# Patient Record
Sex: MALE | Race: BLACK OR AFRICAN AMERICAN | NOT HISPANIC OR LATINO | Employment: UNEMPLOYED | URBAN - METROPOLITAN AREA
[De-identification: names, ages, dates, MRNs, and addresses within clinical notes are randomized per-mention and may not be internally consistent; named-entity substitution may affect disease eponyms.]

---

## 2021-12-06 ENCOUNTER — HOSPITAL ENCOUNTER (EMERGENCY)
Facility: HOSPITAL | Age: 21
Discharge: HOME | End: 2021-12-06
Attending: EMERGENCY MEDICINE | Admitting: EMERGENCY MEDICINE

## 2021-12-06 VITALS
HEIGHT: 70 IN | WEIGHT: 200.4 LBS | DIASTOLIC BLOOD PRESSURE: 70 MMHG | OXYGEN SATURATION: 97 % | HEART RATE: 77 BPM | TEMPERATURE: 98 F | SYSTOLIC BLOOD PRESSURE: 109 MMHG | RESPIRATION RATE: 16 BRPM | BODY MASS INDEX: 28.69 KG/M2

## 2021-12-06 DIAGNOSIS — F10.120 HANGOVER WITHOUT COMPLICATION (HCC): Primary | ICD-10-CM

## 2021-12-06 LAB
ALBUMIN SERPL-MCNC: 4.5 G/DL (ref 3.5–5.2)
ALBUMIN/GLOB SERPL: 1.5 G/DL
ALP SERPL-CCNC: 74 U/L (ref 39–117)
ALT SERPL W P-5'-P-CCNC: 109 U/L (ref 1–41)
ANION GAP SERPL CALCULATED.3IONS-SCNC: 11.5 MMOL/L (ref 5–15)
AST SERPL-CCNC: 44 U/L (ref 1–40)
BACTERIA UR QL AUTO: ABNORMAL /HPF
BASOPHILS # BLD AUTO: 0.02 10*3/MM3 (ref 0–0.2)
BASOPHILS NFR BLD AUTO: 0.2 % (ref 0–1.5)
BILIRUB SERPL-MCNC: 0.6 MG/DL (ref 0–1.2)
BILIRUB UR QL STRIP: NEGATIVE
BUN SERPL-MCNC: 9 MG/DL (ref 6–20)
BUN/CREAT SERPL: 8.7 (ref 7–25)
CALCIUM SPEC-SCNC: 9.4 MG/DL (ref 8.6–10.5)
CHLORIDE SERPL-SCNC: 101 MMOL/L (ref 98–107)
CLARITY UR: CLEAR
CO2 SERPL-SCNC: 25.5 MMOL/L (ref 22–29)
COLOR UR: ABNORMAL
CREAT SERPL-MCNC: 1.03 MG/DL (ref 0.76–1.27)
DEPRECATED RDW RBC AUTO: 38.1 FL (ref 37–54)
EOSINOPHIL # BLD AUTO: 0.06 10*3/MM3 (ref 0–0.4)
EOSINOPHIL NFR BLD AUTO: 0.5 % (ref 0.3–6.2)
ERYTHROCYTE [DISTWIDTH] IN BLOOD BY AUTOMATED COUNT: 11.8 % (ref 12.3–15.4)
ETHANOL BLD-MCNC: <10 MG/DL (ref 0–10)
ETHANOL UR QL: <0.01 %
GFR SERPL CREATININE-BSD FRML MDRD: 113 ML/MIN/1.73
GLOBULIN UR ELPH-MCNC: 3.1 GM/DL
GLUCOSE SERPL-MCNC: 110 MG/DL (ref 65–99)
GLUCOSE UR STRIP-MCNC: NEGATIVE MG/DL
HCT VFR BLD AUTO: 49.1 % (ref 37.5–51)
HGB BLD-MCNC: 17.3 G/DL (ref 13–17.7)
HGB UR QL STRIP.AUTO: NEGATIVE
HOLD SPECIMEN: NORMAL
HOLD SPECIMEN: NORMAL
HYALINE CASTS UR QL AUTO: ABNORMAL /LPF
IMM GRANULOCYTES # BLD AUTO: 0.03 10*3/MM3 (ref 0–0.05)
IMM GRANULOCYTES NFR BLD AUTO: 0.3 % (ref 0–0.5)
KETONES UR QL STRIP: NEGATIVE
LEUKOCYTE ESTERASE UR QL STRIP.AUTO: NEGATIVE
LIPASE SERPL-CCNC: 15 U/L (ref 13–60)
LYMPHOCYTES # BLD AUTO: 1.55 10*3/MM3 (ref 0.7–3.1)
LYMPHOCYTES NFR BLD AUTO: 13.5 % (ref 19.6–45.3)
MCH RBC QN AUTO: 31.3 PG (ref 26.6–33)
MCHC RBC AUTO-ENTMCNC: 35.2 G/DL (ref 31.5–35.7)
MCV RBC AUTO: 88.8 FL (ref 79–97)
MONOCYTES # BLD AUTO: 0.64 10*3/MM3 (ref 0.1–0.9)
MONOCYTES NFR BLD AUTO: 5.6 % (ref 5–12)
NEUTROPHILS NFR BLD AUTO: 79.9 % (ref 42.7–76)
NEUTROPHILS NFR BLD AUTO: 9.19 10*3/MM3 (ref 1.7–7)
NITRITE UR QL STRIP: NEGATIVE
NRBC BLD AUTO-RTO: 0 /100 WBC (ref 0–0.2)
PH UR STRIP.AUTO: 6.5 [PH] (ref 5–8)
PLATELET # BLD AUTO: 327 10*3/MM3 (ref 140–450)
PMV BLD AUTO: 10.3 FL (ref 6–12)
POTASSIUM SERPL-SCNC: 3.6 MMOL/L (ref 3.5–5.2)
PROT SERPL-MCNC: 7.6 G/DL (ref 6–8.5)
PROT UR QL STRIP: ABNORMAL
RBC # BLD AUTO: 5.53 10*6/MM3 (ref 4.14–5.8)
RBC # UR STRIP: ABNORMAL /HPF
REF LAB TEST METHOD: ABNORMAL
SODIUM SERPL-SCNC: 138 MMOL/L (ref 136–145)
SP GR UR STRIP: >=1.03 (ref 1–1.03)
SQUAMOUS #/AREA URNS HPF: ABNORMAL /HPF
UROBILINOGEN UR QL STRIP: ABNORMAL
WBC # UR STRIP: ABNORMAL /HPF
WBC NRBC COR # BLD: 11.49 10*3/MM3 (ref 3.4–10.8)
WHOLE BLOOD HOLD SPECIMEN: NORMAL
WHOLE BLOOD HOLD SPECIMEN: NORMAL

## 2021-12-06 PROCEDURE — 85025 COMPLETE CBC W/AUTO DIFF WBC: CPT | Performed by: EMERGENCY MEDICINE

## 2021-12-06 PROCEDURE — 83690 ASSAY OF LIPASE: CPT

## 2021-12-06 PROCEDURE — 63710000001 ONDANSETRON ODT 4 MG TABLET DISPERSIBLE: Performed by: EMERGENCY MEDICINE

## 2021-12-06 PROCEDURE — 99283 EMERGENCY DEPT VISIT LOW MDM: CPT

## 2021-12-06 PROCEDURE — 36415 COLL VENOUS BLD VENIPUNCTURE: CPT

## 2021-12-06 PROCEDURE — 82077 ASSAY SPEC XCP UR&BREATH IA: CPT

## 2021-12-06 PROCEDURE — 81001 URINALYSIS AUTO W/SCOPE: CPT

## 2021-12-06 PROCEDURE — 80053 COMPREHEN METABOLIC PANEL: CPT

## 2021-12-06 RX ORDER — FAMOTIDINE 20 MG/1
20 TABLET, FILM COATED ORAL ONCE
Status: COMPLETED | OUTPATIENT
Start: 2021-12-06 | End: 2021-12-06

## 2021-12-06 RX ORDER — ONDANSETRON 4 MG/1
4 TABLET, ORALLY DISINTEGRATING ORAL ONCE
Status: COMPLETED | OUTPATIENT
Start: 2021-12-06 | End: 2021-12-06

## 2021-12-06 RX ORDER — SODIUM CHLORIDE 0.9 % (FLUSH) 0.9 %
10 SYRINGE (ML) INJECTION AS NEEDED
Status: DISCONTINUED | OUTPATIENT
Start: 2021-12-06 | End: 2021-12-06 | Stop reason: HOSPADM

## 2021-12-06 RX ADMIN — FAMOTIDINE 20 MG: 20 TABLET, FILM COATED ORAL at 03:26

## 2021-12-06 RX ADMIN — ONDANSETRON 4 MG: 4 TABLET, ORALLY DISINTEGRATING ORAL at 03:26
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
## 2022-04-14 ENCOUNTER — APPOINTMENT (OUTPATIENT)
Dept: GENERAL RADIOLOGY | Facility: HOSPITAL | Age: 22
End: 2022-04-14

## 2022-04-14 ENCOUNTER — HOSPITAL ENCOUNTER (EMERGENCY)
Facility: HOSPITAL | Age: 22
Discharge: HOME | End: 2022-04-14
Attending: EMERGENCY MEDICINE | Admitting: EMERGENCY MEDICINE

## 2022-04-14 VITALS
TEMPERATURE: 99.8 F | HEART RATE: 104 BPM | RESPIRATION RATE: 20 BRPM | HEIGHT: 71 IN | OXYGEN SATURATION: 98 % | WEIGHT: 210.54 LBS | SYSTOLIC BLOOD PRESSURE: 120 MMHG | DIASTOLIC BLOOD PRESSURE: 64 MMHG | BODY MASS INDEX: 29.48 KG/M2

## 2022-04-14 DIAGNOSIS — L52 ERYTHEMA NODOSUM: ICD-10-CM

## 2022-04-14 DIAGNOSIS — B34.9 ACUTE VIRAL SYNDROME: ICD-10-CM

## 2022-04-14 DIAGNOSIS — R50.9 ACUTE FEBRILE ILLNESS: Primary | ICD-10-CM

## 2022-04-14 LAB
ALBUMIN SERPL-MCNC: 4.5 G/DL (ref 3.5–5.2)
ALBUMIN/GLOB SERPL: 1.5 G/DL
ALP SERPL-CCNC: 102 U/L (ref 39–117)
ALT SERPL W P-5'-P-CCNC: 70 U/L (ref 1–41)
ANION GAP SERPL CALCULATED.3IONS-SCNC: 13.7 MMOL/L (ref 5–15)
AST SERPL-CCNC: 56 U/L (ref 1–40)
BACTERIA UR QL AUTO: ABNORMAL /HPF
BASOPHILS # BLD AUTO: 0.02 10*3/MM3 (ref 0–0.2)
BASOPHILS NFR BLD AUTO: 0.2 % (ref 0–1.5)
BILIRUB SERPL-MCNC: 1.5 MG/DL (ref 0–1.2)
BILIRUB UR QL STRIP: ABNORMAL
BUN SERPL-MCNC: 9 MG/DL (ref 6–20)
BUN/CREAT SERPL: 7.4 (ref 7–25)
CALCIUM SPEC-SCNC: 9.1 MG/DL (ref 8.6–10.5)
CHLORIDE SERPL-SCNC: 94 MMOL/L (ref 98–107)
CLARITY UR: CLEAR
CO2 SERPL-SCNC: 26.3 MMOL/L (ref 22–29)
COLOR UR: ABNORMAL
CREAT SERPL-MCNC: 1.22 MG/DL (ref 0.76–1.27)
CRP SERPL-MCNC: 17.36 MG/DL (ref 0–0.5)
DEPRECATED RDW RBC AUTO: 35.8 FL (ref 37–54)
EGFRCR SERPLBLD CKD-EPI 2021: 87 ML/MIN/1.73
EOSINOPHIL # BLD AUTO: 0.01 10*3/MM3 (ref 0–0.4)
EOSINOPHIL NFR BLD AUTO: 0.1 % (ref 0.3–6.2)
ERYTHROCYTE [DISTWIDTH] IN BLOOD BY AUTOMATED COUNT: 11 % (ref 12.3–15.4)
ERYTHROCYTE [SEDIMENTATION RATE] IN BLOOD: 20 MM/HR (ref 0–15)
FLUAV AG NPH QL: NEGATIVE
FLUBV AG NPH QL IA: NEGATIVE
GLOBULIN UR ELPH-MCNC: 3.1 GM/DL
GLUCOSE SERPL-MCNC: 117 MG/DL (ref 65–99)
GLUCOSE UR STRIP-MCNC: NEGATIVE MG/DL
HCT VFR BLD AUTO: 44.4 % (ref 37.5–51)
HETEROPH AB SER QL LA: NEGATIVE
HGB BLD-MCNC: 15.6 G/DL (ref 13–17.7)
HGB UR QL STRIP.AUTO: NEGATIVE
HOLD SPECIMEN: NORMAL
HOLD SPECIMEN: NORMAL
HYALINE CASTS UR QL AUTO: ABNORMAL /LPF
IMM GRANULOCYTES # BLD AUTO: 0.03 10*3/MM3 (ref 0–0.05)
IMM GRANULOCYTES NFR BLD AUTO: 0.3 % (ref 0–0.5)
INR PPP: 1.31 (ref 2–3)
KETONES UR QL STRIP: ABNORMAL
LEUKOCYTE ESTERASE UR QL STRIP.AUTO: ABNORMAL
LYMPHOCYTES # BLD AUTO: 0.87 10*3/MM3 (ref 0.7–3.1)
LYMPHOCYTES NFR BLD AUTO: 8.8 % (ref 19.6–45.3)
MCH RBC QN AUTO: 30.7 PG (ref 26.6–33)
MCHC RBC AUTO-ENTMCNC: 35.1 G/DL (ref 31.5–35.7)
MCV RBC AUTO: 87.4 FL (ref 79–97)
MONOCYTES # BLD AUTO: 0.93 10*3/MM3 (ref 0.1–0.9)
MONOCYTES NFR BLD AUTO: 9.4 % (ref 5–12)
NEUTROPHILS NFR BLD AUTO: 8.06 10*3/MM3 (ref 1.7–7)
NEUTROPHILS NFR BLD AUTO: 81.2 % (ref 42.7–76)
NITRITE UR QL STRIP: NEGATIVE
NRBC BLD AUTO-RTO: 0 /100 WBC (ref 0–0.2)
PH UR STRIP.AUTO: 6.5 [PH] (ref 5–8)
PLATELET # BLD AUTO: 272 10*3/MM3 (ref 140–450)
PMV BLD AUTO: 10.8 FL (ref 6–12)
POTASSIUM SERPL-SCNC: 3.5 MMOL/L (ref 3.5–5.2)
PROT SERPL-MCNC: 7.6 G/DL (ref 6–8.5)
PROT UR QL STRIP: ABNORMAL
PROTHROMBIN TIME: 13.2 SECONDS (ref 9.4–12)
RBC # BLD AUTO: 5.08 10*6/MM3 (ref 4.14–5.8)
RBC # UR STRIP: ABNORMAL /HPF
REF LAB TEST METHOD: ABNORMAL
SARS-COV-2 RNA PNL SPEC NAA+PROBE: NOT DETECTED
SODIUM SERPL-SCNC: 134 MMOL/L (ref 136–145)
SP GR UR STRIP: 1.02 (ref 1–1.03)
SQUAMOUS #/AREA URNS HPF: ABNORMAL /HPF
UROBILINOGEN UR QL STRIP: ABNORMAL
WBC # UR STRIP: ABNORMAL /HPF
WBC NRBC COR # BLD: 9.92 10*3/MM3 (ref 3.4–10.8)
WHOLE BLOOD HOLD SPECIMEN: NORMAL
WHOLE BLOOD HOLD SPECIMEN: NORMAL

## 2022-04-14 PROCEDURE — 86140 C-REACTIVE PROTEIN: CPT | Performed by: EMERGENCY MEDICINE

## 2022-04-14 PROCEDURE — 86308 HETEROPHILE ANTIBODY SCREEN: CPT | Performed by: NURSE PRACTITIONER

## 2022-04-14 PROCEDURE — U0004 COV-19 TEST NON-CDC HGH THRU: HCPCS | Performed by: EMERGENCY MEDICINE

## 2022-04-14 PROCEDURE — 81001 URINALYSIS AUTO W/SCOPE: CPT | Performed by: EMERGENCY MEDICINE

## 2022-04-14 PROCEDURE — 80053 COMPREHEN METABOLIC PANEL: CPT | Performed by: EMERGENCY MEDICINE

## 2022-04-14 PROCEDURE — 85610 PROTHROMBIN TIME: CPT | Performed by: EMERGENCY MEDICINE

## 2022-04-14 PROCEDURE — 87086 URINE CULTURE/COLONY COUNT: CPT | Performed by: EMERGENCY MEDICINE

## 2022-04-14 PROCEDURE — 85025 COMPLETE CBC W/AUTO DIFF WBC: CPT | Performed by: EMERGENCY MEDICINE

## 2022-04-14 PROCEDURE — 87804 INFLUENZA ASSAY W/OPTIC: CPT | Performed by: EMERGENCY MEDICINE

## 2022-04-14 PROCEDURE — 85652 RBC SED RATE AUTOMATED: CPT | Performed by: EMERGENCY MEDICINE

## 2022-04-14 PROCEDURE — C9803 HOPD COVID-19 SPEC COLLECT: HCPCS | Performed by: EMERGENCY MEDICINE

## 2022-04-14 PROCEDURE — 99283 EMERGENCY DEPT VISIT LOW MDM: CPT

## 2022-04-14 PROCEDURE — 36415 COLL VENOUS BLD VENIPUNCTURE: CPT

## 2022-04-14 PROCEDURE — 71045 X-RAY EXAM CHEST 1 VIEW: CPT

## 2022-04-14 RX ORDER — IBUPROFEN 600 MG/1
600 TABLET ORAL ONCE
Status: COMPLETED | OUTPATIENT
Start: 2022-04-14 | End: 2022-04-14

## 2022-04-14 RX ORDER — SODIUM CHLORIDE 0.9 % (FLUSH) 0.9 %
10 SYRINGE (ML) INJECTION AS NEEDED
Status: DISCONTINUED | OUTPATIENT
Start: 2022-04-14 | End: 2022-04-14 | Stop reason: HOSPADM

## 2022-04-14 RX ORDER — ACETAMINOPHEN 325 MG/1
975 TABLET ORAL ONCE
Status: COMPLETED | OUTPATIENT
Start: 2022-04-14 | End: 2022-04-14

## 2022-04-14 RX ADMIN — IBUPROFEN 600 MG: 600 TABLET ORAL at 18:54

## 2022-04-14 RX ADMIN — ACETAMINOPHEN 975 MG: 325 TABLET ORAL at 18:54

## 2022-04-14 RX ADMIN — SODIUM CHLORIDE 1000 ML: 9 INJECTION, SOLUTION INTRAVENOUS at 19:06

## 2022-04-14 NOTE — ED PROVIDER NOTES
Time: 1830  Arrived by: Private vehicle  Chief Complaint: Fever, cough  History provided by: Patient      History of Present Illness:  Patient is a 21 y.o. year old male that presents to the emergency department with acute onset of fever and initially sore throat, followed by productive cough and congestion all week, and recently seen in urgent care and started on amoxicillin for URI 3 days ago.    He reports some swollen lymph nodes on either side of his neck.    He denies any known sick contacts.    At urgent care he swab negative for flu and strep and Covid.    He is now having red and swollen areas in bilateral lower extremities and feet for the past 3 or 4 days.    He has had some nausea and vomiting, reports headaches and body aches.    He is not having any diarrhea or any hematuria.        Similar Symptoms Previously: Yes  Recently seen: Yes, seen at urgent care      Patient Care Team  Primary Care Provider: Dr. Naqvi    Past Medical History:   Reviewed, otherwise healthy; remote history of childhood asthma    Social History     Socioeconomic History   • Marital status: Single   Tobacco Use   • Smoking status: Never Smoker   • Smokeless tobacco: Never Used   Vaping Use   • Vaping Use: Every day   Substance and Sexual Activity   • Alcohol use: Yes     Comment: social         No Known Allergies  History reviewed. No pertinent past medical history.  History reviewed. No pertinent surgical history.  History reviewed. No pertinent family history.    Home Medications:  Prior to Admission medications    Medication Sig Start Date End Date Taking? Authorizing Provider   amoxicillin (AMOXIL) 875 MG tablet Take 1 tablet by mouth 2 (Two) Times a Day for 10 days. 4/12/22 4/22/22  Jimbo Frey PA   brompheniramine-pseudoephedrine-DM 30-2-10 MG/5ML syrup Take 10 mL by mouth 4 (Four) Times a Day As Needed for Congestion, Cough or Allergies. 4/12/22   Jimbo Frey PA   fluticasone (FLONASE) 50 MCG/ACT nasal spray  "2 sprays into the nostril(s) as directed by provider Daily. 4/12/22   Jimbo Frey PA        Record Review:  I have reviewed the patient's records in [facility].     Review of Systems:  Review of Systems   I performed a 10 point review of systems which was all negative, except for the positives found in the HPI above.  Physical Exam:  /64   Pulse 104   Temp 99.8 °F (37.7 °C) (Oral)   Resp 20   Ht 180.3 cm (71\")   Wt 95.5 kg (210 lb 8.6 oz)   SpO2 98%   BMI 29.36 kg/m²     Physical Exam   General: Awake alert and in mild distress, looks to be feeling generally unwell    HEENT: Head normocephalic atraumatic, eyes PERRLA EOMI, nose normal, oropharynx normal, no lesions seen    Neck: Supple full range of motion, no meningismus, mild bilateral anterior cervical lymphadenopathy    Heart: Tachycardic in a regular rhythm, no murmurs or rubs, 2+ radial pulses bilaterally    Lungs: Clear to auscultation bilaterally without wheezes or crackles, no respiratory distress, occasional cough noted    Abdomen: Soft, nontender, nondistended, no rebound or guarding    Skin: Warm, dry, multiple scattered raised and mildly tender erythematous nodules on anterior lower legs and feet concerning for erythema nodosum.    Musculoskeletal: Normal range of motion, no lower extremity edema    Neurologic: Oriented x3, no motor deficits no sensory deficits    Psychiatric: Mood appears stable, no psychosis        Medications in the Emergency Department:  Medications   ibuprofen (ADVIL,MOTRIN) tablet 600 mg (600 mg Oral Given 4/14/22 1854)   sodium chloride 0.9 % bolus 1,000 mL (0 mL Intravenous Stopped 4/14/22 2051)   acetaminophen (TYLENOL) tablet 975 mg (975 mg Oral Given 4/14/22 1854)        Labs  Lab Results (last 24 hours)     Procedure Component Value Units Date/Time    Mononucleosis Screen [[id number]]  (Normal) Collected: 04/14/22 1719    Specimen: Blood Updated: 04/14/22 1801     Monospot Negative    CBC & Differential " [[id number]]  (Abnormal) Collected: 04/14/22 1719    Specimen: Blood Updated: 04/14/22 1836    Narrative:      The following orders were created for panel order CBC & Differential.  Procedure                               Abnormality         Status                     ---------                               -----------         ------                     CBC Auto Differential[[id number]]        Abnormal            Final result                 Please view results for these tests on the individual orders.    Comprehensive Metabolic Panel [[id number]]  (Abnormal) Collected: 04/14/22 1719    Specimen: Blood Updated: 04/14/22 1849     Glucose 117 mg/dL      BUN 9 mg/dL      Creatinine 1.22 mg/dL      Sodium 134 mmol/L      Potassium 3.5 mmol/L      Chloride 94 mmol/L      CO2 26.3 mmol/L      Calcium 9.1 mg/dL      Total Protein 7.6 g/dL      Albumin 4.50 g/dL      ALT (SGPT) 70 U/L      AST (SGOT) 56 U/L      Alkaline Phosphatase 102 U/L      Total Bilirubin 1.5 mg/dL      Globulin 3.1 gm/dL      A/G Ratio 1.5 g/dL      BUN/Creatinine Ratio 7.4     Anion Gap 13.7 mmol/L      eGFR 87.0 mL/min/1.73      Comment: National Kidney Foundation and American Society of Nephrology (ASN) Task Force recommended calculation based on the Chronic Kidney Disease Epidemiology Collaboration (CKD-EPI) equation refit without adjustment for race.       Narrative:      GFR Normal >60  Chronic Kidney Disease <60  Kidney Failure <15      Sedimentation Rate [[id number]]  (Abnormal) Collected: 04/14/22 1719    Specimen: Blood Updated: 04/14/22 1838     Sed Rate 20 mm/hr     C-reactive Protein [[id number]]  (Abnormal) Collected: 04/14/22 1719    Specimen: Blood Updated: 04/14/22 1850     C-Reactive Protein 17.36 mg/dL     Protime-INR [[id number]]  (Abnormal) Collected: 04/14/22 1719    Specimen: Blood Updated: 04/14/22 1837     Protime 13.2 Seconds      INR 1.31    Narrative:      Suggested Therapeutic Ranges For Oral Anticoagulant Therapy:  Level of  Therapy                      INR Target Range  Standard Dose                            2.0-3.0  High Dose                                2.5-3.5  Patients not receiving anticoagulant  Therapy Normal Range                     0.6-1.2    CBC Auto Differential [[id number]]  (Abnormal) Collected: 04/14/22 1719    Specimen: Blood Updated: 04/14/22 1836     WBC 9.92 10*3/mm3      RBC 5.08 10*6/mm3      Hemoglobin 15.6 g/dL      Hematocrit 44.4 %      MCV 87.4 fL      MCH 30.7 pg      MCHC 35.1 g/dL      RDW 11.0 %      RDW-SD 35.8 fl      MPV 10.8 fL      Platelets 272 10*3/mm3      Neutrophil % 81.2 %      Lymphocyte % 8.8 %      Monocyte % 9.4 %      Eosinophil % 0.1 %      Basophil % 0.2 %      Immature Grans % 0.3 %      Neutrophils, Absolute 8.06 10*3/mm3      Lymphocytes, Absolute 0.87 10*3/mm3      Monocytes, Absolute 0.93 10*3/mm3      Eosinophils, Absolute 0.01 10*3/mm3      Basophils, Absolute 0.02 10*3/mm3      Immature Grans, Absolute 0.03 10*3/mm3      nRBC 0.0 /100 WBC     Influenza Antigen, Rapid - Swab, Nasopharynx [[id number]]  (Normal) Collected: 04/14/22 1901    Specimen: Swab from Nasopharynx Updated: 04/14/22 1931     Influenza A Ag, EIA Negative     Influenza B Ag, EIA Negative    COVID PRE-OP / PRE-PROCEDURE SCREENING ORDER (NO ISOLATION) - Swab, Nasopharynx [[id number]]  (Normal) Collected: 04/14/22 1901    Specimen: Swab from Nasopharynx Updated: 04/14/22 2302    Narrative:      The following orders were created for panel order COVID PRE-OP / PRE-PROCEDURE SCREENING ORDER (NO ISOLATION) - Swab, Nasopharynx.  Procedure                               Abnormality         Status                     ---------                               -----------         ------                     COVID-19,APTIMA PANTHER(...[[id number]]  Normal              Final result                 Please view results for these tests on the individual orders.    COVID-19,APTIMA PANTHER(LYNDA), ESTELLA/ BRADLEY, NP/OP SWAB IN  UTM/VTM/SALINE TRANSPORT MEDIA,24 HR TAT - Swab, Nasopharynx [[id number]]  (Normal) Collected: 04/14/22 1901    Specimen: Swab from Nasopharynx Updated: 04/14/22 2302     COVID19 Not Detected    Narrative:      Fact sheet for providers: https://www.fda.gov/media/851881/download     Fact sheet for patients: https://www.fda.gov/media/737995/download    Test performed by RT PCR.    Urinalysis With Culture If Indicated - Urine, Clean Catch [[id number]]  (Abnormal) Collected: 04/14/22 2051    Specimen: Urine, Clean Catch Updated: 04/14/22 2103     Color, UA Dark Yellow     Appearance, UA Clear     pH, UA 6.5     Specific Gravity, UA 1.016     Glucose, UA Negative     Ketones, UA 15 mg/dL (1+)     Bilirubin, UA Small (1+)     Blood, UA Negative     Protein,  mg/dL (2+)     Leuk Esterase, UA Trace     Nitrite, UA Negative     Urobilinogen, UA >=8.0 E.U./dL    Urinalysis, Microscopic Only - Urine, Clean Catch [[id number]]  (Abnormal) Collected: 04/14/22 2051    Specimen: Urine, Clean Catch Updated: 04/14/22 2103     RBC, UA 3-5 /HPF      WBC, UA 6-12 /HPF      Bacteria, UA None Seen /HPF      Squamous Epithelial Cells, UA 0-2 /HPF      Hyaline Casts, UA 3-6 /LPF      Methodology Automated Microscopy    Urine Culture - Urine, Urine, Clean Catch [[id number]] Collected: 04/14/22 2051    Specimen: Urine, Clean Catch Updated: 04/14/22 2103           Imaging:  XR Chest 1 View    Result Date: 4/14/2022  PROCEDURE: XR CHEST 1 VW  COMPARISON: [facility], CR, CHEST PA/AP & LAT 2V, 2/26/2004, 1:24.  INDICATIONS: fever, cough  FINDINGS:  LUNGS: Normal.  No significant pulmonary parenchymal abnormalities.  VASCULATURE: Normal.  Unremarkable pulmonary vasculature.  CARDIAC: Normal.  No cardiac silhouette abnormality or cardiomegaly.  MEDIASTINUM: Normal.  No visible mass or adenopathy.  PLEURA: Normal.  No effusion or pleural thickening.  BONES: Normal.  No fracture or visible bony lesion.  OTHER: Negative.        No  acute cardiopulmonary process identified.       GAURANG WATKINS MD       Electronically Signed and Approved By: GAURANG WATKINS MD on 4/14/2022 at 19:12                Procedures:  Procedures    Progress                            Medical Decision Making:  MDM   In my differential diagnosis for this patient's fever I considered pneumonia, urinary tract infection, intra-abdominal process, skin or soft tissue infection, viral illness including COVID-19 or flu virus, bacteremia.        This patient is a pleasant 21-year-old otherwise healthy male presenting with new onset of fever and cough.    He arrives here with temperature of 100.7 Fahrenheit and heart rate of 120.    We are starting some IV fluids and giving Tylenol and getting urinalysis and chest x-ray and screening lab work as well as Covid and flu swabs and Monospot test.    I am checking inflammatory markers and platelets and coagulation studies given the possible purpura on his legs versus erythema nodosum.        Patient has normal white blood cell count and urinalysis and chest x-ray negative for infection, and flu swab negative and Monospot negative.    He does have elevated inflammatory markers and slightly elevated LFTs, and I consider most likely he has a self-limiting viral syndrome with concurrent erythema nodosum either secondary to the viral syndrome or secondary to recent amoxicillin use.    As I do not really see any indication for antibiotics at this time I will have him hold off on the amoxicillin in case of a hypersensitivity reaction resulting in erythema nodosum.    He will follow-up with his PCP this week, and I gave him supportive care instructions for rest, p.o. fluids, alternate Tylenol and ibuprofen as needed for fevers and aches, return to the ER for any worsening symptoms.    Final diagnoses:   Acute febrile illness   Acute viral syndrome   Erythema nodosum        Disposition:  ED Disposition     ED Disposition   Discharge     Condition   Stable    Comment   --                    Mulugeta Perera MD  04/15/22 0147

## 2022-04-15 LAB — BACTERIA SPEC AEROBE CULT: NO GROWTH

## 2022-04-15 NOTE — DISCHARGE INSTRUCTIONS
No signs of a bacterial infection were seen and you had a normal white blood cell count and normal chest x-ray and urine here.    You most likely have a viral illness causing your fever cough nausea and vomiting and malaise, which should run its course this week.    Get plenty of rest and drink plenty fluids for hydration and alternate Tylenol with ibuprofen as needed for fevers or aches.    The red welts on your legs and feet are due to erythema nodosum which can be seen following a viral illness or may possibly be due to a hypersensitivity reaction to the amoxicillin antibiotic you are on.    You could stop taking the amoxicillin, as you do not appear to have any bacterial infection that it would even be treating.    Please follow-up with your primary care physician to have your blood work rechecked within the week to make sure it is improving, as you had some mild elevation of your liver enzymes, which can be seen with a viral illness..

## 2022-11-07 ENCOUNTER — HOSPITAL ENCOUNTER (EMERGENCY)
Facility: HOSPITAL | Age: 22
Discharge: HOME | End: 2022-11-07
Attending: EMERGENCY MEDICINE | Admitting: EMERGENCY MEDICINE

## 2022-11-07 VITALS
BODY MASS INDEX: 30.8 KG/M2 | RESPIRATION RATE: 16 BRPM | SYSTOLIC BLOOD PRESSURE: 121 MMHG | TEMPERATURE: 98 F | OXYGEN SATURATION: 100 % | HEART RATE: 94 BPM | HEIGHT: 71 IN | WEIGHT: 220 LBS | DIASTOLIC BLOOD PRESSURE: 76 MMHG

## 2022-11-07 DIAGNOSIS — T50.901A ACCIDENTAL DRUG OVERDOSE, INITIAL ENCOUNTER: Primary | ICD-10-CM

## 2022-11-07 PROCEDURE — 99283 EMERGENCY DEPT VISIT LOW MDM: CPT

## 2022-11-07 NOTE — ED PROVIDER NOTES
Time: 2:33 AM EST  Arrived by: ambulance  Chief Complaint: Drug overdose  History provided by: pt  History is limited by: N/A     History of Present Illness:  Patient is a 21 y.o. year old male that presents to the emergency department via EMS after being found unresponsive.  Patient states he had taken a pill for recreational purposes and was not trying to harm himself.  EMS administered Narcan in route and the patient responded.  Patient vomited prior to arrival.  Patient had a nasal trumpet placed by EMS but he removed it upon arrival.  Patient is alert and oriented at time of exam.    HPI    Similar Symptoms Previously: no  Recently seen: no      Patient Care Team  Primary Care Provider: Laverne Naqvi MD    Past Medical History:     No Known Allergies  History reviewed. No pertinent past medical history.  History reviewed. No pertinent surgical history.  History reviewed. No pertinent family history.    Home Medications:  Prior to Admission medications    Medication Sig Start Date End Date Taking? Authorizing Provider   brompheniramine-pseudoephedrine-DM 30-2-10 MG/5ML syrup Take 10 mL by mouth 4 (Four) Times a Day As Needed for Congestion, Cough or Allergies. 4/12/22   Jimbo Frey PA   fluticasone (FLONASE) 50 MCG/ACT nasal spray 2 sprays into the nostril(s) as directed by provider Daily. 4/12/22   Jimbo Frey PA        Social History:   Social History     Tobacco Use   • Smoking status: Never   • Smokeless tobacco: Never   Vaping Use   • Vaping Use: Every day   Substance Use Topics   • Alcohol use: Yes     Comment: social   • Drug use: Yes       Review of Systems:  Review of Systems   Constitutional: Negative.    HENT: Negative.    Eyes: Negative.    Respiratory: Negative.    Cardiovascular: Negative.    Gastrointestinal: Negative.    Endocrine: Negative.    Genitourinary: Negative.    Musculoskeletal: Negative.    Skin: Negative.    Allergic/Immunologic: Negative.    Neurological:  "Negative.    Hematological: Negative.    Psychiatric/Behavioral: Negative.         Physical Exam:  /81   Pulse 97   Temp 97.9 °F (36.6 °C)   Resp 17   Ht 180.3 cm (71\")   Wt 99.8 kg (220 lb)   SpO2 100%   BMI 30.68 kg/m²     Physical Exam  Vitals and nursing note reviewed.   Constitutional:       Appearance: Normal appearance.   HENT:      Head: Normocephalic and atraumatic.   Eyes:      Extraocular Movements: Extraocular movements intact.      Pupils: Pupils are equal, round, and reactive to light.   Cardiovascular:      Rate and Rhythm: Normal rate and regular rhythm.      Heart sounds: Normal heart sounds.   Pulmonary:      Effort: Pulmonary effort is normal.      Breath sounds: Normal breath sounds.   Abdominal:      Palpations: Abdomen is soft.   Musculoskeletal:         General: Normal range of motion.      Cervical back: Normal range of motion and neck supple.   Skin:     General: Skin is warm and dry.   Neurological:      General: No focal deficit present.      Mental Status: He is alert and oriented to person, place, and time.   Psychiatric:         Mood and Affect: Mood normal.                Medications in the Emergency Department:  Medications - No data to display     Labs  Lab Results (last 24 hours)     ** No results found for the last 24 hours. **           Imaging:  No Radiology Exams Resulted Within Past 24 Hours    Procedures:  Procedures    Progress                            Medical Decision Making:  MDM   Patient was observed in the emergency department for 2 hours and is stable for discharge.  I advised the patient that the pill he took likely contained fentanyl which can cause life-threatening overdose.        Final diagnoses:   Accidental drug overdose, initial encounter        Disposition:  ED Disposition     ED Disposition   Discharge    Condition   Stable    Comment   --             Documentation assistance provided by Reji Donis DO acting as scribe for Reji Donis DO. " Information recorded by the scribe was done at my direction and has been verified and validated by me.        Reji Donis DO  11/07/22 0434

## 2023-08-02 ENCOUNTER — OFFICE VISIT (OUTPATIENT)
Dept: FAMILY MEDICINE CLINIC | Facility: CLINIC | Age: 23
End: 2023-08-02
Payer: COMMERCIAL

## 2023-08-02 VITALS
WEIGHT: 247 LBS | SYSTOLIC BLOOD PRESSURE: 115 MMHG | OXYGEN SATURATION: 99 % | DIASTOLIC BLOOD PRESSURE: 69 MMHG | BODY MASS INDEX: 35.36 KG/M2 | HEART RATE: 96 BPM | HEIGHT: 70 IN

## 2023-08-02 DIAGNOSIS — N50.89 TESTICLE LUMP: ICD-10-CM

## 2023-08-02 DIAGNOSIS — Z00.00 ANNUAL PHYSICAL EXAM: Primary | ICD-10-CM

## 2023-08-02 DIAGNOSIS — Z11.59 NEED FOR HEPATITIS C SCREENING TEST: ICD-10-CM

## 2023-08-02 DIAGNOSIS — Z13.29 SCREENING FOR THYROID DISORDER: ICD-10-CM

## 2023-08-02 DIAGNOSIS — Z13.220 SCREENING FOR LIPID DISORDERS: ICD-10-CM

## 2023-08-18 ENCOUNTER — HOSPITAL ENCOUNTER (OUTPATIENT)
Dept: ULTRASOUND IMAGING | Facility: HOSPITAL | Age: 23
Discharge: HOME | End: 2023-08-18
Payer: COMMERCIAL

## 2023-08-18 DIAGNOSIS — N50.89 TESTICLE LUMP: ICD-10-CM

## 2023-08-18 PROCEDURE — 76870 US EXAM SCROTUM: CPT

## 2024-04-20 ENCOUNTER — HOSPITAL ENCOUNTER (EMERGENCY)
Facility: HOSPITAL | Age: 24
Discharge: HOME | End: 2024-04-20
Attending: EMERGENCY MEDICINE
Payer: COMMERCIAL

## 2024-04-20 VITALS
WEIGHT: 249.12 LBS | OXYGEN SATURATION: 98 % | HEIGHT: 70 IN | TEMPERATURE: 98.2 F | RESPIRATION RATE: 20 BRPM | DIASTOLIC BLOOD PRESSURE: 91 MMHG | HEART RATE: 102 BPM | BODY MASS INDEX: 35.66 KG/M2 | SYSTOLIC BLOOD PRESSURE: 142 MMHG

## 2024-04-20 DIAGNOSIS — J45.21 MILD INTERMITTENT ASTHMA WITH EXACERBATION: ICD-10-CM

## 2024-04-20 DIAGNOSIS — J40 BRONCHITIS: Primary | ICD-10-CM

## 2024-04-20 LAB
FLUAV SUBTYP SPEC NAA+PROBE: NOT DETECTED
FLUBV RNA ISLT QL NAA+PROBE: NOT DETECTED
RSV RNA NPH QL NAA+NON-PROBE: NOT DETECTED
S PYO AG THROAT QL: NEGATIVE
SARS-COV-2 RNA RESP QL NAA+PROBE: NOT DETECTED

## 2024-04-20 PROCEDURE — 99283 EMERGENCY DEPT VISIT LOW MDM: CPT

## 2024-04-20 PROCEDURE — 87880 STREP A ASSAY W/OPTIC: CPT | Performed by: EMERGENCY MEDICINE

## 2024-04-20 PROCEDURE — 94640 AIRWAY INHALATION TREATMENT: CPT

## 2024-04-20 PROCEDURE — 25010000002 DEXAMETHASONE PER 1 MG: Performed by: NURSE PRACTITIONER

## 2024-04-20 PROCEDURE — 94799 UNLISTED PULMONARY SVC/PX: CPT

## 2024-04-20 PROCEDURE — 87637 SARSCOV2&INF A&B&RSV AMP PRB: CPT | Performed by: EMERGENCY MEDICINE

## 2024-04-20 PROCEDURE — 87081 CULTURE SCREEN ONLY: CPT | Performed by: EMERGENCY MEDICINE

## 2024-04-20 RX ORDER — ALBUTEROL SULFATE 90 UG/1
2 AEROSOL, METERED RESPIRATORY (INHALATION) EVERY 4 HOURS PRN
Qty: 6.7 G | Refills: 0 | Status: SHIPPED | OUTPATIENT
Start: 2024-04-20

## 2024-04-20 RX ORDER — MONTELUKAST SODIUM 10 MG/1
10 TABLET ORAL NIGHTLY
Qty: 14 TABLET | Refills: 0 | Status: SHIPPED | OUTPATIENT
Start: 2024-04-20 | End: 2024-05-04

## 2024-04-20 RX ORDER — PREDNISONE 50 MG/1
50 TABLET ORAL DAILY
Qty: 4 TABLET | Refills: 0 | Status: SHIPPED | OUTPATIENT
Start: 2024-04-20 | End: 2024-04-24

## 2024-04-20 RX ORDER — AZITHROMYCIN 250 MG/1
TABLET, FILM COATED ORAL
Qty: 6 TABLET | Refills: 0 | Status: SHIPPED | OUTPATIENT
Start: 2024-04-20

## 2024-04-20 RX ORDER — AZITHROMYCIN 250 MG/1
500 TABLET, FILM COATED ORAL ONCE
Status: COMPLETED | OUTPATIENT
Start: 2024-04-20 | End: 2024-04-20

## 2024-04-20 RX ORDER — IPRATROPIUM BROMIDE AND ALBUTEROL SULFATE 2.5; .5 MG/3ML; MG/3ML
3 SOLUTION RESPIRATORY (INHALATION) ONCE
Status: COMPLETED | OUTPATIENT
Start: 2024-04-20 | End: 2024-04-20

## 2024-04-20 RX ADMIN — AZITHROMYCIN DIHYDRATE 500 MG: 250 TABLET ORAL at 21:34

## 2024-04-20 RX ADMIN — DEXAMETHASONE SODIUM PHOSPHATE 10 MG: 10 INJECTION INTRAMUSCULAR; INTRAVENOUS at 21:34

## 2024-04-20 RX ADMIN — IPRATROPIUM BROMIDE AND ALBUTEROL SULFATE 3 ML: .5; 3 SOLUTION RESPIRATORY (INHALATION) at 21:42

## 2024-04-20 NOTE — Clinical Note
[facility] EMERGENCY ROOM  [address] [place] [address]  Phone: [phone]  Fax: [phone]    Pablo Ross was seen and treated in our emergency department on 4/20/2024.  He may return to work on 04/22/2024.         Thank you for choosing [facility].    Rebecca Shaw APRN

## 2024-04-21 NOTE — DISCHARGE INSTRUCTIONS
Rest, plenty of fluids.  Take your meds as prescribed.  Complete the antibiotics.  You may take over-the-counter acetaminophen and Motrin as needed for aches pains and fever.  Follow-up with your primary care provider for further evaluation and treatment and to ensure that your symptoms are getting better with time occasions.  Return to the emergency department immediately for any acutely developing respiratory distress, any persistent vomiting, any persistent chest pain with shortness of breath, any fevers of 101 or greater, or any new or worse concerns.

## 2024-04-21 NOTE — ED PROVIDER NOTES
Time: 9:35 PM EDT  Date of encounter:  4/20/2024  Independent Historian/Clinical History and Information was obtained by:   Patient    History is limited by: N/A    Chief Complaint: CONGESTION/WHEEZING      History of Present Illness:      Of the patient presents to the emergency department complains of upper respiratory symptoms of cough and congestion that he states he has had for about a week and a half.  He states that he had a history of asthma when he was younger but has not had it in inhaler for several years but states that he is felt like he has had wheezing tightness in his chest like he did when he was young intermittently this past week.  He denies any fevers.  States he has had no nausea or vomiting.  He reports that he has been coughing up quite a bit of clear but also some scant yellow mucus.  On exam his breath sounds are clear.  His airway is patent.  He states that he still feels tight in his chest when he takes a deep breath but there is no audible wheezing.  He denies any leg or calf pain.  He reports no hemoptysis.      History provided by:  Patient   used: No        Patient Care Team  Primary Care Provider: Zoraida Tuttle APRN    Past Medical History:     No Known Allergies  Past Medical History:   Diagnosis Date    Accidental drug overdose     Asthma      History reviewed. No pertinent surgical history.  History reviewed. No pertinent family history.    Home Medications:  Prior to Admission medications    Medication Sig Start Date End Date Taking? Authorizing Provider   albuterol sulfate  (90 Base) MCG/ACT inhaler Inhale 2 puffs Every 4 (Four) Hours As Needed for Wheezing or Shortness of Air. 4/20/24   Rebecca Shaw APRN   azithromycin (Zithromax Z-Ace) 250 MG tablet Take 2 tablets by mouth on day 1, then 1 tablet daily on days 2-5 4/20/24   Rebecca Shaw APRN   montelukast (SINGULAIR) 10 MG tablet Take 1 tablet by mouth Every Night for 14 days. 4/20/24 5/4/24   "Rebecca Shaw APRN   predniSONE (DELTASONE) 50 MG tablet Take 1 tablet by mouth Daily for 4 days. 4/20/24 4/24/24  Rebecca Shaw APRN        Social History:   Social History     Tobacco Use    Smoking status: Never    Smokeless tobacco: Never   Vaping Use    Vaping status: Every Day   Substance Use Topics    Alcohol use: Yes     Comment: social    Drug use: Yes         Review of Systems:  Review of Systems   Constitutional:  Negative for chills and fever.   HENT:  Positive for congestion and rhinorrhea. Negative for ear pain, sore throat and trouble swallowing.    Eyes:  Negative for pain.   Respiratory:  Positive for cough, chest tightness, shortness of breath and wheezing.    Cardiovascular:  Negative for chest pain and leg swelling.   Gastrointestinal:  Negative for abdominal pain, diarrhea, nausea and vomiting.   Genitourinary:  Negative for dysuria, flank pain, frequency, hematuria and urgency.   Musculoskeletal:  Negative for back pain, joint swelling, neck pain and neck stiffness.   Skin:  Negative for pallor and rash.   Neurological:  Negative for seizures and headaches.   All other systems reviewed and are negative.       Physical Exam:  /94   Pulse 97   Temp 98.4 °F (36.9 °C) (Oral)   Resp 20   Ht 177.8 cm (70\")   Wt 113 kg (249 lb 1.9 oz)   SpO2 97%   BMI 35.74 kg/m²     Physical Exam  Vitals and nursing note reviewed.   Constitutional:       General: He is not in acute distress.     Appearance: Normal appearance. He is not ill-appearing or toxic-appearing.   HENT:      Head: Normocephalic and atraumatic.      Nose: Congestion and rhinorrhea present.      Mouth/Throat:      Mouth: Mucous membranes are moist.      Pharynx: Oropharynx is clear.   Eyes:      General: No scleral icterus.     Conjunctiva/sclera: Conjunctivae normal.      Pupils: Pupils are equal, round, and reactive to light.   Cardiovascular:      Rate and Rhythm: Normal rate and regular rhythm.      Pulses: Normal pulses.    "   Heart sounds: Normal heart sounds.   Pulmonary:      Effort: Pulmonary effort is normal. No respiratory distress.      Breath sounds: Normal breath sounds. No wheezing.   Musculoskeletal:         General: No swelling or tenderness. Normal range of motion.      Cervical back: Normal range of motion and neck supple. No rigidity or tenderness.      Right lower leg: No edema.      Left lower leg: No edema.   Lymphadenopathy:      Cervical: No cervical adenopathy.   Skin:     General: Skin is warm and dry.      Capillary Refill: Capillary refill takes less than 2 seconds.      Findings: No rash.   Neurological:      General: No focal deficit present.      Mental Status: He is alert and oriented to person, place, and time. Mental status is at baseline.   Psychiatric:         Mood and Affect: Mood normal.         Behavior: Behavior normal.                Procedures:  Procedures      Medical Decision Making:      Comorbidities that affect care:    Asthma    External Notes reviewed:    None      The following orders were placed and all results were independently analyzed by me:  Orders Placed This Encounter   Procedures    COVID-19, FLU A/B, RSV PCR 1 HR TAT - Swab, Nasopharynx    Rapid Strep A Screen - Swab, Throat    Beta Strep Culture, Throat - Swab, Throat       Medications Given in the Emergency Department:  Medications   ipratropium-albuterol (DUO-NEB) nebulizer solution 3 mL (3 mL Nebulization Given 4/20/24 2142)   dexAMETHasone (DECADRON) 10 MG/ML oral solution 10 mg (10 mg Oral Given 4/20/24 2134)   azithromycin (ZITHROMAX) tablet 500 mg (500 mg Oral Given 4/20/24 2134)        ED Course:         Labs:    Lab Results (last 24 hours)       Procedure Component Value Units Date/Time    COVID-19, FLU A/B, RSV PCR 1 HR TAT - Swab, Nasopharynx [[id number]]  (Normal) Collected: 04/20/24 2043    Specimen: Swab from Nasopharynx Updated: 04/20/24 2126     COVID19 Not Detected     Influenza A PCR Not Detected     Influenza B  PCR Not Detected     RSV, PCR Not Detected    Narrative:      Fact sheet for providers: https://www.fda.gov/media/067920/download    Fact sheet for patients: https://www.fda.gov/media/566958/download    Test performed by PCR.    Rapid Strep A Screen - Swab, Throat [[id number]]  (Normal) Collected: 04/20/24 2043    Specimen: Swab from Throat Updated: 04/20/24 2106     Strep A Ag Negative    Beta Strep Culture, Throat - Swab, Throat [[id number]] Collected: 04/20/24 2043    Specimen: Swab from Throat Updated: 04/20/24 2105             Imaging:    No Radiology Exams Resulted Within Past 24 Hours      Differential Diagnosis and Discussion:    Cough: Differential diagnosis includes but is not limited to pneumonia, acute bronchitis, upper respiratory infection, ACE inhibitor use, allergic reaction, epiglottitis, seasonal allergies, chemical irritants, exercise-induced asthma, viral syndrome.  Dyspnea: Differential diagnosis includes but is not limited to metabolic acidosis, neurological disorders, psychogenic, asthma, pneumothorax, upper airway obstruction, COPD, pneumonia, noncardiogenic pulmonary edema, interstitial lung disease, anemia, congestive heart failure, and pulmonary embolism    All X-rays impressions were independently interpreted by me.    MDM  Number of Diagnoses or Management Options  Bronchitis: new and requires workup  Mild intermittent asthma with exacerbation: new and requires workup     Amount and/or Complexity of Data Reviewed  Clinical lab tests: reviewed    Risk of Complications, Morbidity, and/or Mortality  Presenting problems: low  Diagnostic procedures: low  Management options: low    Patient Progress  Patient progress: stable             Patient Care Considerations:    LABS: I considered ordering labs, however considering the patient stable condition and complaint I did not feel it was warranted at this time.      Consultants/Shared Management Plan:    None    Social Determinants of  Health:    Patient is independent, reliable, and has access to care.       Disposition and Care Coordination:    Discharged: The patient is suitable and stable for discharge with no need for consideration of admission.    I have explained the patient´s condition, diagnoses and treatment plan based on the information available to me at this time. I have answered questions and addressed any concerns. The patient has a good  understanding of the patient´s diagnosis, condition, and treatment plan as can be expected at this point. The vital signs have been stable. The patient´s condition is stable and appropriate for discharge from the emergency department.      The patient will pursue further outpatient evaluation with the primary care physician or other designated or consulting physician as outlined in the discharge instructions. They are agreeable to this plan of care and follow-up instructions have been explained in detail. The patient has received these instructions in written format and has expressed an understanding of the discharge instructions. The patient is aware that any significant change in condition or worsening of symptoms should prompt an immediate return to this or the closest emergency department or call to 911.  I have explained discharge medications and the need for follow up with the patient/caretakers. This was also printed in the discharge instructions. Patient was discharged with the following medications and follow up:      Medication List        New Prescriptions      albuterol sulfate  (90 Base) MCG/ACT inhaler  Commonly known as: PROVENTIL HFA;VENTOLIN HFA;PROAIR HFA  Inhale 2 puffs Every 4 (Four) Hours As Needed for Wheezing or Shortness of Air.     azithromycin 250 MG tablet  Commonly known as: Zithromax Z-Ace  Take 2 tablets by mouth on day 1, then 1 tablet daily on days 2-5     montelukast 10 MG tablet  Commonly known as: SINGULAIR  Take 1 tablet by mouth Every Night for 14 days.      predniSONE 50 MG tablet  Commonly known as: DELTASONE  Take 1 tablet by mouth Daily for 4 days.               Where to Get Your Medications        These medications were sent to [facility]/pharmacy #[id number] - [place], [place] - [address] - [phone]  - [phone] FX  [address] [place] [address]      Hours: 24-hours Phone: [phone]   albuterol sulfate  (90 Base) MCG/ACT inhaler  azithromycin 250 MG tablet  montelukast 10 MG tablet  predniSONE 50 MG tablet      Zoraida Tuttle, APRN  [address] [address] [phone]    Call   FOR FOLLOW UP       Final diagnoses:   Bronchitis   Mild intermittent asthma with exacerbation        ED Disposition       ED Disposition   Discharge    Condition   Stable    Comment   --               This medical record created using voice recognition software.             Rebecca Shaw, APRN  04/20/24 3061

## 2024-04-23 LAB — BACTERIA SPEC AEROBE CULT: NORMAL

## 2025-04-22 ENCOUNTER — APPOINTMENT (OUTPATIENT)
Dept: ULTRASOUND IMAGING | Facility: HOSPITAL | Age: 25
End: 2025-04-22
Payer: COMMERCIAL

## 2025-04-22 ENCOUNTER — HOSPITAL ENCOUNTER (EMERGENCY)
Facility: HOSPITAL | Age: 25
Discharge: HOME | End: 2025-04-23
Attending: EMERGENCY MEDICINE
Payer: COMMERCIAL

## 2025-04-22 DIAGNOSIS — I86.1 VARICOCELE: Primary | ICD-10-CM

## 2025-04-22 PROCEDURE — 99284 EMERGENCY DEPT VISIT MOD MDM: CPT

## 2025-04-22 PROCEDURE — 76870 US EXAM SCROTUM: CPT

## 2025-04-22 NOTE — Clinical Note
[facility] EMERGENCY ROOM  [address] [place] [address]  Phone: [phone]  Fax: [phone]    Pablo Ross was seen and treated in our emergency department on 4/22/2025.  He may return to work on 04/24/2025.         Thank you for choosing [facility].    Rebecca Shaw APRN

## 2025-04-23 VITALS
SYSTOLIC BLOOD PRESSURE: 120 MMHG | OXYGEN SATURATION: 97 % | BODY MASS INDEX: 33.52 KG/M2 | WEIGHT: 234.13 LBS | DIASTOLIC BLOOD PRESSURE: 83 MMHG | RESPIRATION RATE: 17 BRPM | TEMPERATURE: 97.7 F | HEART RATE: 80 BPM | HEIGHT: 70 IN

## 2025-04-23 LAB
BILIRUB UR QL STRIP: NEGATIVE
C TRACH RRNA CVX QL NAA+PROBE: NOT DETECTED
CLARITY UR: CLEAR
COLOR UR: YELLOW
GLUCOSE UR STRIP-MCNC: NEGATIVE MG/DL
HGB UR QL STRIP.AUTO: NEGATIVE
KETONES UR QL STRIP: ABNORMAL
LEUKOCYTE ESTERASE UR QL STRIP.AUTO: NEGATIVE
N GONORRHOEA RRNA SPEC QL NAA+PROBE: NOT DETECTED
NITRITE UR QL STRIP: NEGATIVE
PH UR STRIP.AUTO: 6 [PH] (ref 5–8)
PROT UR QL STRIP: NEGATIVE
SP GR UR STRIP: 1.03 (ref 1–1.03)
UROBILINOGEN UR QL STRIP: ABNORMAL

## 2025-04-23 PROCEDURE — 81003 URINALYSIS AUTO W/O SCOPE: CPT | Performed by: EMERGENCY MEDICINE

## 2025-04-23 PROCEDURE — 87591 N.GONORRHOEAE DNA AMP PROB: CPT | Performed by: EMERGENCY MEDICINE

## 2025-04-23 PROCEDURE — 87491 CHLMYD TRACH DNA AMP PROBE: CPT | Performed by: EMERGENCY MEDICINE

## 2025-04-23 RX ORDER — DICLOFENAC SODIUM 75 MG/1
75 TABLET, DELAYED RELEASE ORAL 2 TIMES DAILY
Qty: 30 TABLET | Refills: 0 | Status: SHIPPED | OUTPATIENT
Start: 2025-04-23

## 2025-04-23 NOTE — ED PROVIDER NOTES
Time: 1:57 AM EDT  Date of encounter:  4/22/2025  Independent Historian/Clinical History and Information was obtained by:   Patient    History is limited by: N/A    Chief Complaint: SMALL KNOT OF LEFT TESTES      History of Present Illness:    The patient is a 24 y.o. year old male who presents to the emergency department for evaluation of a lump that he states is on his left testicle.  States that it started bothering him a couple weeks ago on and off.  He states that he does walk long distances at work and that typically makes it worse.  He denies any heavy lifting or any other strenuous activity.  He states that he has had no fevers.  He denies any urinary symptoms.  He states he has had no penile discharge.  He reports no redness or drainage from the site.  Patient has previously had a left varicocele but had not ever followed up with urology.  He denies any difficulty with urination.      Patient Care Team  Primary Care Provider: Zoraida Tuttle APRN    Past Medical History:     No Known Allergies  Past Medical History:   Diagnosis Date    Accidental drug overdose     Asthma      History reviewed. No pertinent surgical history.  History reviewed. No pertinent family history.    Home Medications:  Prior to Admission medications    Medication Sig Start Date End Date Taking? Authorizing Provider   brompheniramine-pseudoephedrine-DM 30-2-10 MG/5ML syrup Take 10 mL by mouth 3 (Three) Times a Day As Needed for Congestion or Cough. 3/17/25  Yes Kristopher Garnett MD   ondansetron ODT (ZOFRAN-ODT) 4 MG disintegrating tablet Place 2 tablets on the tongue Every 8 (Eight) Hours As Needed for Nausea or Vomiting. 3/17/25  Yes Kristopher Garnett MD        Social History:   Social History     Tobacco Use    Smoking status: Never    Smokeless tobacco: Never   Vaping Use    Vaping status: Every Day    Substances: Nicotine    Devices: Disposable    Passive vaping exposure: Yes   Substance Use Topics    Alcohol use: Yes     Comment:  "social    Drug use: Yes         Review of Systems:  Review of Systems   Constitutional:  Negative for chills and fever.   HENT:  Negative for congestion, ear pain and sore throat.    Eyes:  Negative for pain.   Respiratory:  Negative for cough, chest tightness and shortness of breath.    Cardiovascular:  Negative for chest pain.   Gastrointestinal:  Negative for abdominal pain, diarrhea, nausea and vomiting.   Genitourinary:  Positive for testicular pain (Small tender knot on the left testicle). Negative for decreased urine volume, difficulty urinating, dysuria, flank pain, frequency, genital sores, hematuria, penile discharge, penile pain, penile swelling, scrotal swelling (Small tender knot on the left testicle) and urgency.   Musculoskeletal:  Negative for back pain, joint swelling and neck pain.   Skin:  Negative for color change, pallor, rash and wound.   Neurological:  Negative for seizures and headaches.   All other systems reviewed and are negative.       Physical Exam:  /83   Pulse 80   Temp 97.7 °F (36.5 °C) (Oral)   Resp 17   Ht 177.8 cm (70\")   Wt 106 kg (234 lb 2.1 oz)   SpO2 97%   BMI 33.59 kg/m²     Physical Exam  Vitals and nursing note reviewed.   Constitutional:       General: He is not in acute distress.     Appearance: Normal appearance. He is not ill-appearing or toxic-appearing.   HENT:      Head: Normocephalic and atraumatic.   Eyes:      General: No scleral icterus.     Conjunctiva/sclera: Conjunctivae normal.      Pupils: Pupils are equal, round, and reactive to light.   Cardiovascular:      Rate and Rhythm: Normal rate and regular rhythm.      Pulses: Normal pulses.   Pulmonary:      Effort: Pulmonary effort is normal. No respiratory distress.   Abdominal:      General: Abdomen is flat.      Palpations: Abdomen is soft.      Tenderness: There is no abdominal tenderness. There is no guarding or rebound.   Musculoskeletal:         General: Normal range of motion.      Cervical " back: Normal range of motion and neck supple. No rigidity or tenderness.   Lymphadenopathy:      Cervical: No cervical adenopathy.   Skin:     General: Skin is warm and dry.      Capillary Refill: Capillary refill takes less than 2 seconds.      Findings: No rash.   Neurological:      General: No focal deficit present.      Mental Status: He is alert and oriented to person, place, and time. Mental status is at baseline.   Psychiatric:         Mood and Affect: Mood normal.         Behavior: Behavior normal.        Medical Decision Making:      Comorbidities that affect care:    Accidental drug overdose, left varicocele    External Notes reviewed:    Previous Clinic Note: Was seen in the urgent care on 12/10/2024 for viral upper respiratory symptoms      The following orders were placed and all results were independently analyzed by me:  Orders Placed This Encounter   Procedures    Chlamydia trachomatis, Neisseria gonorrhoeae, PCR - Urine, Urine, Clean Catch    US Scrotum & Testicles    Urinalysis With Culture If Indicated - Urine, Clean Catch    Ambulatory Referral to Urology       Medications Given in the Emergency Department:  Medications - No data to display     ED Course:         Labs:    Lab Results (last 24 hours)       Procedure Component Value Units Date/Time    Urinalysis With Culture If Indicated - Urine, Clean Catch [[id number]]  (Abnormal) Collected: 04/23/25 0115    Specimen: Urine, Clean Catch Updated: 04/23/25 0122     Color, UA Yellow     Appearance, UA Clear     pH, UA 6.0     Specific Gravity, UA 1.028     Glucose, UA Negative     Ketones, UA 15 mg/dL (1+)     Bilirubin, UA Negative     Blood, UA Negative     Protein, UA Negative     Leuk Esterase, UA Negative     Nitrite, UA Negative     Urobilinogen, UA 1.0 E.U./dL    Narrative:      In absence of clinical symptoms, the presence of pyuria, bacteria, and/or nitrites on the urinalysis result does not correlate with infection.  Urine microscopic not  indicated.    Chlamydia trachomatis, Neisseria gonorrhoeae, PCR - Urine, Urine, Clean Catch [[id number]]  (Normal) Collected: 04/23/25 0115    Specimen: Urine, Clean Catch Updated: 04/23/25 0250     Chlamydia DNA by PCR Not Detected     Neisseria gonorrhoeae by PCR Not Detected             Imaging:    US Scrotum & Testicles  Result Date: 4/22/2025  US SCROTUM AND TESTICLES-  Date of exam: 4/22/2025 11:00 PM.  Indication: Left-sided testicular/scrotal pain for two (2) weeks.  Comparison: 8/18/2023.  TECHNIQUE: The scrotum and testicles (testes) were evaluated by routine duplex ultrasound examination, including two-dimensional (2D) grayscale (B-mode) images as well as color/spectral duplex Doppler analysis.  FINDINGS:  TESTES: Normal. No visible mass. Normal echotexture, size, and flow are noted. The right testis measures 4.5 x 2.1 x 3.1 cm. The right testicular volume is 15 mL. The left testis measures 4.4 x 2 x 3.3 cm. The left testicular volume is 14.5 mL.  EPIDIDYMIDES: No acute findings are seen involving the epididymides. No acute epididymitis. Tiny right-sided epididymal head cysts are seen and measure about 2 mm or less in size. These findings are of doubtful clinical significance. The anteroposterior (AP) dimension of the right epididymal head is 1 cm. The AP dimension of the left epididymal head is 0.9 cm.  OTHER: There is a suspected left-sided varicocele, seen previously. It measures at least 5.2 (CC) x 1.1 (AP) cm no definite right-sided varicocele. No significant hydrocele is appreciated bilaterally. No bowel-containing inguinal hernia, extending into the scrotal sac, is identified. No abnormalities of the scrotal wall are seen. No ectopic gas foci are suggested. No focal sizable (drainable) organized fluid collection is appreciated to suggest abscess or hematoma.        1. There is a left-sided varicocele, seen previously, and not significantly changed. No right varicocele.  2. No testicular torsion is  identified.  3. No intrinsic or extrinsic testicular masses are seen.  4. No acute epididymo-orchitis is suggested.  5. Please see above comments for further detail.   Portions of this note were completed with a voice recognition program.  4/22/2025 11:45 PM by Jagdish Ken MD on Workstation: [id number]          Differential Diagnosis and Discussion:    Testicular Pain: Differential diagnosis includes but is not limited to epididymitis, orchitis, testicular torsion, testicular tumor, testicular trauma, hydrocele, varicocele, spermatocele, prostatitis, scrotal cellulitis, and urolithiasis.    PROCEDURES:    Ultrasound was performed in the emergency department and the ultrasound impression was interpreted by me.     No orders to display       Procedures    MDM  Number of Diagnoses or Management Options  Varicocele: established and worsening     Amount and/or Complexity of Data Reviewed  Clinical lab tests: reviewed  Tests in the radiology section of CPT®: reviewed    Risk of Complications, Morbidity, and/or Mortality  Presenting problems: low  Diagnostic procedures: low  Management options: low    Patient Progress  Patient progress: stable       Patient Care Considerations:    ANTIBIOTICS: I considered prescribing antibiotics as an outpatient however no bacterial focus of infection was found.      Consultants/Shared Management Plan:    None    Social Determinants of Health:    Patient is independent, reliable, and has access to care.       Disposition and Care Coordination:    Discharged: The patient is suitable and stable for discharge with no need for consideration of admission.    I have explained the patient´s condition, diagnoses and treatment plan based on the information available to me at this time. I have answered questions and addressed any concerns. The patient has a good  understanding of the patient´s diagnosis, condition, and treatment plan as can be expected at this point. The vital signs have been  stable. The patient´s condition is stable and appropriate for discharge from the emergency department.      The patient will pursue further outpatient evaluation with the primary care physician or other designated or consulting physician as outlined in the discharge instructions. They are agreeable to this plan of care and follow-up instructions have been explained in detail. The patient has received these instructions in written format and has expressed an understanding of the discharge instructions. The patient is aware that any significant change in condition or worsening of symptoms should prompt an immediate return to this or the closest emergency department or call to 1.  I have explained discharge medications and the need for follow up with the patient/caretakers. This was also printed in the discharge instructions. Patient was discharged with the following medications and follow up:      Medication List        New Prescriptions      diclofenac 75 MG EC tablet  Commonly known as: VOLTAREN  Take 1 tablet by mouth 2 (Two) Times a Day.               Where to Get Your Medications        These medications were sent to [id number] - [place], [place] - [address] AT [facility] OF [address] & [place] - [phone]  - [phone]   [address] [address]      Phone: [phone]   diclofenac 75 MG EC tablet      Felicia Orosco MD  [address]  [address]  [place] [address]  [phone]          Zoraida Tuttle APRN  [address]  [address]  [place] [address]  [phone]      As needed, FOR FOLLOW UP       Final diagnoses:   Varicocele        ED Disposition       ED Disposition   Discharge    Condition   Stable    Comment   --               This medical record created using voice recognition software.             Rebecca Shaw, JULIANNE  04/23/25 0259

## 2025-04-23 NOTE — DISCHARGE INSTRUCTIONS
Your ultrasound today again showed a varicocele which looks unchanged from the last time this test was done.  I would try using some type of jock support to help with comfort since you do walk quite a bit.  I am also sending prescription to the pharmacy for you to take that you can take acetaminophen with to help with comfort.  I am also placing referral for urology to follow-up.  This can lead to worsening problems and even fertility I think it is important that you follow-up with the urologist since this has been a chronic thing for a while.  Follow-up with your family doctor as needed.  Return to the emergency department immediately for any acutely developing redness, any areas of drainage, any fevers of 101 or greater or any new or worse concerns.

## 2025-05-28 ENCOUNTER — TELEPHONE (OUTPATIENT)
Dept: UROLOGY | Age: 25
End: 2025-05-28

## 2025-05-28 NOTE — TELEPHONE ENCOUNTER
Caller: Pablo Ross     Relationship:  SELF    Best call back number: [phone]     PATIENT CALLED REQUESTING TO CANCEL SAME DAY APPT.    Did the patient call AFTER the start time of their scheduled appointment?  []YES  [x]NO    Was the patient's appointment rescheduled? [x]YES  []NO    Any additional information: PT HAVING CAR TROUBLE

## 2025-06-09 NOTE — PROGRESS NOTES
Chief Complaint: varicocele    Subjective         History of Present Illness  Pablo Ross is a 24 y.o. male presents to [facility] to be seen for varicocele.    History of Present Illness  The patient is a 24-year-old male here for evaluation of varicocele.    He has been experiencing a palpable mass in his left testicle for approximately 2 years. Initially, he sought medical attention for this issue 2 years ago and was reassured that it was benign. However, he has recently noticed an increase in the size of the mass, accompanied by intermittent pain and cramping. A subsequent medical consultation revealed the presence of a varicocele. He was prescribed diclofenac but did not fill the prescription, which has since . He reports no urinary symptoms such as hematuria, frequency, or urgency.     He also has no history of kidney stones or surgical interventions involving the bladder, kidneys, prostate, or scrotum    . He reports no personal history of cardiac or pulmonary conditions and is not currently on anticoagulant therapy.     He is a smoker.         SOCIAL HISTORY  He admits to smoking.    FAMILY HISTORY  He reports no family history of bladder, kidney, or prostate cancers.    MEDICATIONS  Diclofenac (prescribed but not taken)        Narrative & Impression   US SCROTUM AND TESTICLES-     Date of exam: 2025 11:00 PM.     Indication: Left-sided testicular/scrotal pain for two (2) weeks.     Comparison: 2023.     FINDINGS:     TESTES: Normal. No visible mass. Normal echotexture, size, and flow are  noted. The right testis measures 4.5 x 2.1 x 3.1 cm. The right  testicular volume is 15 mL. The left testis measures 4.4 x 2 x 3.3 cm.  The left testicular volume is 14.5 mL.     EPIDIDYMIDES: No acute findings are seen involving the epididymides. No  acute epididymitis. Tiny right-sided epididymal head cysts are seen and  measure about 2 mm or less in size. These findings are  of doubtful  clinical significance. The anteroposterior (AP) dimension of the right  epididymal head is 1 cm. The AP dimension of the left epididymal head is  0.9 cm.     OTHER: There is a suspected left-sided varicocele, seen previously. It  measures at least 5.2 (CC) x 1.1 (AP) cm no definite right-sided  varicocele. No significant hydrocele is appreciated bilaterally. No  bowel-containing inguinal hernia, extending into the scrotal sac, is  identified. No abnormalities of the scrotal wall are seen. No ectopic  gas foci are suggested. No focal sizable (drainable) organized fluid  collection is appreciated to suggest abscess or hematoma.        IMPRESSION:     1. There is a left-sided varicocele, seen previously, and not  significantly changed. No right varicocele.     2. No testicular torsion is identified.     3. No intrinsic or extrinsic testicular masses are seen.     4. No acute epididymo-orchitis is suggested.     5. Please see above comments for further detail.        Portions of this note were completed with a voice recognition program.     4/22/2025 11:45 PM by Jagdish Ken MD on Workstation: [id number]         Objective     Past Medical History:   Diagnosis Date    Accidental drug overdose     Asthma        History reviewed. No pertinent surgical history.      Current Outpatient Medications:     diclofenac (VOLTAREN) 75 MG EC tablet, Take 1 tablet by mouth 2 (Two) Times a Day., Disp: 15 tablet, Rfl: 0    brompheniramine-pseudoephedrine-DM 30-2-10 MG/5ML syrup, Take 10 mL by mouth 3 (Three) Times a Day As Needed for Congestion or Cough., Disp: 120 mL, Rfl: 0    ondansetron ODT (ZOFRAN-ODT) 4 MG disintegrating tablet, Place 2 tablets on the tongue Every 8 (Eight) Hours As Needed for Nausea or Vomiting., Disp: 10 tablet, Rfl: 0    No Known Allergies     History reviewed. No pertinent family history.    Social History     Socioeconomic History    Marital status: Single   Tobacco Use    Smoking status: Every Day  "    Types: Cigarettes     Passive exposure: Current    Smokeless tobacco: Never   Vaping Use    Vaping status: Every Day    Substances: Nicotine    Devices: Disposable    Passive vaping exposure: Yes   Substance and Sexual Activity    Alcohol use: Yes     Comment: social    Drug use: Yes    Sexual activity: Defer       Vital Signs:   Resp 14   Ht 177.8 cm (70\")   Wt 104 kg (230 lb)   BMI 33.00 kg/m²      Physical Exam  Vitals reviewed.   Constitutional:       Appearance: Normal appearance.   Neurological:      General: No focal deficit present.      Mental Status: He is alert and oriented to person, place, and time.   Psychiatric:         Mood and Affect: Mood normal.         Behavior: Behavior normal.          Result Review :   The following data was reviewed by: JULIANNE Zuleta on 05/28/2025:  Results for orders placed or performed during the hospital encounter of 04/22/25   Chlamydia trachomatis, Neisseria gonorrhoeae, PCR - Urine, Urine, Clean Catch    Collection Time: 04/23/25  1:15 AM    Specimen: Urine, Clean Catch   Result Value Ref Range    Chlamydia by PCR Not Detected Not Detected     Neisseria gonorrhoeae by PCR Not Detected Not Detected    Urinalysis With Culture If Indicated - Urine, Clean Catch    Collection Time: 04/23/25  1:15 AM    Specimen: Urine, Clean Catch   Result Value Ref Range    Color, UA Yellow Yellow, Straw    Appearance, UA Clear Clear    pH, UA 6.0 5.0 - 8.0    Specific Gravity, UA 1.028 1.005 - 1.030    Glucose, UA Negative Negative    Ketones, UA 15 mg/dL (1+) (A) Negative    Bilirubin, UA Negative Negative    Blood, UA Negative Negative    Protein, UA Negative Negative    Leuk Esterase, UA Negative Negative    Nitrite, UA Negative Negative    Urobilinogen, UA 1.0 E.U./dL 0.2 - 1.0 E.U./dL          Results  Imaging  Ultrasound shows no significant change from previous ultrasound and a tiny right-sided epididymal cyst.      Procedures        Assessment and Plan  "   Diagnoses and all orders for this visit:    1. Scrotal pain (Primary)  -     diclofenac (VOLTAREN) 75 MG EC tablet; Take 1 tablet by mouth 2 (Two) Times a Day.  Dispense: 15 tablet; Refill: 0    2. Varicocele    3. Epididymal cyst        Assessment & Plan  1. Varicocele.  . The potential impact of varicocele on fertility was discussed, emphasizing that while the risk is low, it could contribute to infertility. The ultrasound did not show any significant changes from the previous one. The achiness he experiences may not necessarily improve with the removal of the varicocele    . He was advised to take diclofenac or ibuprofen when the discomfort becomes particularly bothersome.     The use of supportive underwear was recommended to help manage the condition.     He was also advised to elevate his scrotum when at home to facilitate reabsorption into the body. A prescription for diclofenac will be sent to [facility].       2. Right-sided epididymal cyst.  A tiny right-sided epididymal cyst was noted on the ultrasound. This cyst is not a cause for concern and does not require any treatment as it never turns into anything dangerous.      Follow Up   No follow-ups on file.  Patient was given instructions and counseling regarding his condition or for health maintenance advice. Please see specific information pulled into the AVS if appropriate.         This document has been electronically signed by JULIANNE Zuleta  June 11, 2025 09:49 EDT     Patient or patient representative verbalized consent for the use of Ambient Listening during the visit with  JULIANNE Zuleta for chart documentation. 6/11/2025  09:49 EDT

## 2025-06-11 ENCOUNTER — OFFICE VISIT (OUTPATIENT)
Dept: UROLOGY | Age: 25
End: 2025-06-11
Payer: COMMERCIAL

## 2025-06-11 VITALS — BODY MASS INDEX: 32.93 KG/M2 | HEIGHT: 70 IN | RESPIRATION RATE: 14 BRPM | WEIGHT: 230 LBS

## 2025-06-11 DIAGNOSIS — N50.82 SCROTAL PAIN: Primary | ICD-10-CM

## 2025-06-11 DIAGNOSIS — N50.3 EPIDIDYMAL CYST: ICD-10-CM

## 2025-06-11 DIAGNOSIS — I86.1 VARICOCELE: ICD-10-CM

## 2025-06-11 RX ORDER — DICLOFENAC SODIUM 75 MG/1
75 TABLET, DELAYED RELEASE ORAL 2 TIMES DAILY
Qty: 15 TABLET | Refills: 0 | Status: SHIPPED | OUTPATIENT
Start: 2025-06-11